# Patient Record
Sex: FEMALE | Race: WHITE | HISPANIC OR LATINO | Employment: FULL TIME | URBAN - METROPOLITAN AREA
[De-identification: names, ages, dates, MRNs, and addresses within clinical notes are randomized per-mention and may not be internally consistent; named-entity substitution may affect disease eponyms.]

---

## 2024-10-24 ENCOUNTER — HOSPITAL ENCOUNTER (EMERGENCY)
Facility: HOSPITAL | Age: 29
Discharge: HOME | End: 2024-10-24
Payer: OTHER MISCELLANEOUS

## 2024-10-24 VITALS
HEIGHT: 63 IN | HEART RATE: 76 BPM | DIASTOLIC BLOOD PRESSURE: 82 MMHG | WEIGHT: 162.04 LBS | RESPIRATION RATE: 18 BRPM | TEMPERATURE: 98 F | SYSTOLIC BLOOD PRESSURE: 127 MMHG | OXYGEN SATURATION: 100 % | BODY MASS INDEX: 28.71 KG/M2

## 2024-10-24 DIAGNOSIS — S05.02XA ABRASION OF LEFT CORNEA, INITIAL ENCOUNTER: Primary | ICD-10-CM

## 2024-10-24 PROCEDURE — 99283 EMERGENCY DEPT VISIT LOW MDM: CPT

## 2024-10-24 RX ORDER — ERYTHROMYCIN 5 MG/G
OINTMENT OPHTHALMIC EVERY 6 HOURS
Qty: 1 G | Refills: 0 | Status: SHIPPED | OUTPATIENT
Start: 2024-10-24

## 2024-10-24 RX ORDER — ERYTHROMYCIN 5 MG/G
1 OINTMENT OPHTHALMIC ONCE
Status: COMPLETED | OUTPATIENT
Start: 2024-10-24 | End: 2024-10-24

## 2024-10-24 RX ORDER — PROPARACAINE HYDROCHLORIDE 5 MG/ML
2 SOLUTION/ DROPS OPHTHALMIC ONCE
Status: COMPLETED | OUTPATIENT
Start: 2024-10-24 | End: 2024-10-24

## 2024-10-24 RX ADMIN — PROPARACAINE HYDROCHLORIDE 2 DROP: 5 SOLUTION/ DROPS OPHTHALMIC at 12:54

## 2024-10-24 RX ADMIN — ERYTHROMYCIN 1 APPLICATION: 5 OINTMENT OPHTHALMIC at 12:55

## 2024-10-24 RX ADMIN — FLUORESCEIN SODIUM 1 STRIP: 1 STRIP OPHTHALMIC at 12:54

## 2024-10-24 NOTE — ED PROVIDER NOTES
Subjective   History of Present Illness  Patient is a 28-year-old  female who presents today with complaints of foreign body sensation in the left eye.  She was at work today when she felt like something flew into her eye.  She is concerned it could be a piece of metal.  Complains of pain.  No definite visual changes.      Review of Systems   Eyes:  Positive for photophobia and pain. Negative for discharge, redness and itching.       No past medical history on file.    No Known Allergies    No past surgical history on file.    No family history on file.    Social History     Socioeconomic History    Marital status: Single           Objective   Physical Exam  Constitutional:       Appearance: Normal appearance.   HENT:      Head: Normocephalic.      Nose: Nose normal.      Mouth/Throat:      Mouth: Mucous membranes are moist. Mucous membranes are dry.   Eyes:      General: Lids are normal. Lids are everted, no foreign bodies appreciated. Vision grossly intact. Gaze aligned appropriately.         Left eye: No foreign body.      Extraocular Movements: Extraocular movements intact.      Conjunctiva/sclera:      Left eye: Left conjunctiva is injected.      Pupils: Pupils are equal, round, and reactive to light.      Left eye: Corneal abrasion present. Harish exam negative.  Cardiovascular:      Rate and Rhythm: Normal rate and regular rhythm.   Pulmonary:      Effort: Pulmonary effort is normal.      Breath sounds: Normal breath sounds.   Musculoskeletal:      Cervical back: Normal range of motion and neck supple.   Skin:     General: Skin is warm and dry.   Neurological:      Mental Status: She is alert.         Procedures           ED Course      Labs Reviewed - No data to display  No radiology results for the last day  Medications   fluorescein ophthalmic strip 1 strip (1 strip Both Eyes Given 10/24/24 1254)   proparacaine (ALCAINE) 0.5 % ophthalmic solution 2 drop (2 drops Left Eye Given 10/24/24 1254)    erythromycin (ROMYCIN) ophthalmic ointment 1 Application (1 Application Left Eye Given 10/24/24 7042)                                              Medical Decision Making  Patient presents today with the above complaint.    She had the above exam and evaluation.  Wood lamp exam reveals corneal abrasion at approximately 1 o'clock position.  No visible foreign body of the cornea or beneath the lid.  Visual acuity as follows: OD 20/40, OS 20/30, OU 20/30.    Findings were discussed with the patient.  Her eye was thoroughly irrigated with saline.  Ilotycin ointment was instilled.    She will be discharged home with prescription for Ilotycin ointment and instructed follow-up with eye specialist.  She was given warning signs for prompt return the ED voiced understanding.    Problems Addressed:  Abrasion of left cornea, initial encounter: complicated acute illness or injury    Risk  Prescription drug management.        Final diagnoses:   Abrasion of left cornea, initial encounter       ED Disposition  ED Disposition       ED Disposition   Discharge    Condition   Stable    Comment   --               [facility] - [address] [address] [phone]  Schedule an appointment as soon as possible for a visit            Medication List        New Prescriptions      erythromycin 5 MG/GM ophthalmic ointment  Commonly known as: ROMYCIN  Administer  to the right eye Every 6 (Six) Hours.               Where to Get Your Medications        These medications were sent to [facility] [id number] - [facility] [place] - [address] [facility] - [phone]  - [phone] FX  [address], [place] [address]      Phone: [phone]   erythromycin 5 MG/GM ophthalmic ointment            Danna Avila, RON  10/24/24 2982

## 2024-10-24 NOTE — DISCHARGE INSTRUCTIONS
Use eye ointment in the left eye 4 times a day for the next 3 days.  Follow-up with eye doctor if no improvement.  Return for new or worsening symptoms.

## 2024-10-24 NOTE — Clinical Note
[facility] EMERGENCY DEPARTMENT  [address] [place] [address]  Phone: [phone]    Milagros Mcdonald was seen and treated in our emergency department on 10/24/2024.  She may return to work on 10/26/2024.         Thank you for choosing [facility].    Danna Avila APRN